# Patient Record
Sex: MALE | Race: BLACK OR AFRICAN AMERICAN | Employment: FULL TIME | URBAN - METROPOLITAN AREA
[De-identification: names, ages, dates, MRNs, and addresses within clinical notes are randomized per-mention and may not be internally consistent; named-entity substitution may affect disease eponyms.]

---

## 2018-09-19 ENCOUNTER — APPOINTMENT (OUTPATIENT)
Dept: CT IMAGING | Age: 58
DRG: 392 | End: 2018-09-19
Attending: EMERGENCY MEDICINE
Payer: COMMERCIAL

## 2018-09-19 ENCOUNTER — APPOINTMENT (OUTPATIENT)
Dept: GENERAL RADIOLOGY | Age: 58
DRG: 392 | End: 2018-09-19
Attending: EMERGENCY MEDICINE
Payer: COMMERCIAL

## 2018-09-19 ENCOUNTER — HOSPITAL ENCOUNTER (OUTPATIENT)
Facility: HOSPITAL | Age: 58
Setting detail: OBSERVATION
Discharge: HOME | DRG: 392 | End: 2018-09-20
Attending: EMERGENCY MEDICINE | Admitting: HOSPITALIST
Payer: COMMERCIAL

## 2018-09-19 DIAGNOSIS — K56.600 PARTIAL SMALL BOWEL OBSTRUCTION (HCC): Primary | ICD-10-CM

## 2018-09-19 PROBLEM — R73.9 HYPERGLYCEMIA: Status: ACTIVE | Noted: 2018-09-19

## 2018-09-19 PROBLEM — R79.89 AZOTEMIA: Status: ACTIVE | Noted: 2018-09-19

## 2018-09-19 LAB
ALBUMIN SERPL-MCNC: 4.3 G/DL (ref 3.5–4.8)
ALBUMIN/GLOB SERPL: 0.9 {RATIO} (ref 1–2)
ALP LIVER SERPL-CCNC: 85 U/L (ref 45–117)
ALT SERPL-CCNC: 38 U/L (ref 17–63)
ANION GAP SERPL CALC-SCNC: 9 MMOL/L (ref 0–18)
AST SERPL-CCNC: 24 U/L (ref 15–41)
ATRIAL RATE: 67 BPM
ATRIAL RATE: 91 BPM
BASOPHILS # BLD AUTO: 0.01 X10(3) UL (ref 0–0.1)
BASOPHILS NFR BLD AUTO: 0.2 %
BILIRUB SERPL-MCNC: 0.7 MG/DL (ref 0.1–2)
BUN BLD-MCNC: 24 MG/DL (ref 8–20)
BUN/CREAT SERPL: 21.8 (ref 10–20)
CALCIUM BLD-MCNC: 9.6 MG/DL (ref 8.3–10.3)
CHLORIDE SERPL-SCNC: 103 MMOL/L (ref 101–111)
CO2 SERPL-SCNC: 26 MMOL/L (ref 22–32)
CREAT BLD-MCNC: 1.1 MG/DL (ref 0.7–1.3)
EOSINOPHIL # BLD AUTO: 0.01 X10(3) UL (ref 0–0.3)
EOSINOPHIL NFR BLD AUTO: 0.2 %
ERYTHROCYTE [DISTWIDTH] IN BLOOD BY AUTOMATED COUNT: 12.1 % (ref 11.5–16)
GLOBULIN PLAS-MCNC: 4.8 G/DL (ref 2.5–4)
GLUCOSE BLD-MCNC: 134 MG/DL (ref 70–99)
HCT VFR BLD AUTO: 47.1 % (ref 37–53)
HGB BLD-MCNC: 15.2 G/DL (ref 13–17)
IMMATURE GRANULOCYTE COUNT: 0.02 X10(3) UL (ref 0–1)
IMMATURE GRANULOCYTE RATIO %: 0.3 %
LIPASE: 120 U/L (ref 73–393)
LYMPHOCYTES # BLD AUTO: 0.74 X10(3) UL (ref 0.9–4)
LYMPHOCYTES NFR BLD AUTO: 12.3 %
M PROTEIN MFR SERPL ELPH: 9.1 G/DL (ref 6.1–8.3)
MCH RBC QN AUTO: 31.2 PG (ref 27–33.2)
MCHC RBC AUTO-ENTMCNC: 32.3 G/DL (ref 31–37)
MCV RBC AUTO: 96.7 FL (ref 80–99)
MONOCYTES # BLD AUTO: 0.23 X10(3) UL (ref 0.1–1)
MONOCYTES NFR BLD AUTO: 3.8 %
NEUTROPHIL ABS PRELIM: 5.03 X10 (3) UL (ref 1.3–6.7)
NEUTROPHILS # BLD AUTO: 5.03 X10(3) UL (ref 1.3–6.7)
NEUTROPHILS NFR BLD AUTO: 83.2 %
OSMOLALITY SERPL CALC.SUM OF ELEC: 292 MOSM/KG (ref 275–295)
P AXIS: 37 DEGREES
P AXIS: 60 DEGREES
P-R INTERVAL: 158 MS
P-R INTERVAL: 184 MS
PLATELET # BLD AUTO: 228 10(3)UL (ref 150–450)
POTASSIUM SERPL-SCNC: 4 MMOL/L (ref 3.6–5.1)
Q-T INTERVAL: 346 MS
Q-T INTERVAL: 390 MS
QRS DURATION: 94 MS
QRS DURATION: 96 MS
QTC CALCULATION (BEZET): 412 MS
QTC CALCULATION (BEZET): 425 MS
R AXIS: 40 DEGREES
R AXIS: 47 DEGREES
RBC # BLD AUTO: 4.87 X10(6)UL (ref 4.3–5.7)
RED CELL DISTRIBUTION WIDTH-SD: 43.4 FL (ref 35.1–46.3)
SODIUM SERPL-SCNC: 138 MMOL/L (ref 136–144)
T AXIS: 18 DEGREES
T AXIS: 39 DEGREES
TROPONIN I SERPL-MCNC: <0.046 NG/ML (ref ?–0.05)
VENTRICULAR RATE: 67 BPM
VENTRICULAR RATE: 91 BPM
WBC # BLD AUTO: 6 X10(3) UL (ref 4–13)

## 2018-09-19 PROCEDURE — 71275 CT ANGIOGRAPHY CHEST: CPT | Performed by: EMERGENCY MEDICINE

## 2018-09-19 PROCEDURE — 99223 1ST HOSP IP/OBS HIGH 75: CPT | Performed by: HOSPITALIST

## 2018-09-19 PROCEDURE — 71045 X-RAY EXAM CHEST 1 VIEW: CPT | Performed by: EMERGENCY MEDICINE

## 2018-09-19 PROCEDURE — 74177 CT ABD & PELVIS W/CONTRAST: CPT | Performed by: EMERGENCY MEDICINE

## 2018-09-19 PROCEDURE — 99223 1ST HOSP IP/OBS HIGH 75: CPT | Performed by: COLON & RECTAL SURGERY

## 2018-09-19 RX ORDER — ONDANSETRON 2 MG/ML
4 INJECTION INTRAMUSCULAR; INTRAVENOUS EVERY 6 HOURS PRN
Status: DISCONTINUED | OUTPATIENT
Start: 2018-09-19 | End: 2018-09-20

## 2018-09-19 RX ORDER — ONDANSETRON 2 MG/ML
4 INJECTION INTRAMUSCULAR; INTRAVENOUS ONCE
Status: COMPLETED | OUTPATIENT
Start: 2018-09-19 | End: 2018-09-19

## 2018-09-19 RX ORDER — HYDROMORPHONE HYDROCHLORIDE 1 MG/ML
0.5 INJECTION, SOLUTION INTRAMUSCULAR; INTRAVENOUS; SUBCUTANEOUS EVERY 30 MIN PRN
Status: ACTIVE | OUTPATIENT
Start: 2018-09-19 | End: 2018-09-19

## 2018-09-19 RX ORDER — SODIUM CHLORIDE 9 MG/ML
INJECTION, SOLUTION INTRAVENOUS CONTINUOUS
Status: DISCONTINUED | OUTPATIENT
Start: 2018-09-19 | End: 2018-09-20

## 2018-09-19 RX ORDER — KETOROLAC TROMETHAMINE 30 MG/ML
15 INJECTION, SOLUTION INTRAMUSCULAR; INTRAVENOUS ONCE
Status: COMPLETED | OUTPATIENT
Start: 2018-09-19 | End: 2018-09-19

## 2018-09-19 RX ORDER — ACETAMINOPHEN 325 MG/1
650 TABLET ORAL EVERY 6 HOURS PRN
Status: DISCONTINUED | OUTPATIENT
Start: 2018-09-19 | End: 2018-09-20

## 2018-09-19 RX ORDER — ONDANSETRON 2 MG/ML
4 INJECTION INTRAMUSCULAR; INTRAVENOUS EVERY 4 HOURS PRN
Status: DISCONTINUED | OUTPATIENT
Start: 2018-09-19 | End: 2018-09-19

## 2018-09-19 RX ORDER — SODIUM CHLORIDE 9 MG/ML
INJECTION, SOLUTION INTRAVENOUS CONTINUOUS
Status: ACTIVE | OUTPATIENT
Start: 2018-09-19 | End: 2018-09-19

## 2018-09-19 RX ORDER — MORPHINE SULFATE 4 MG/ML
4 INJECTION, SOLUTION INTRAMUSCULAR; INTRAVENOUS EVERY 30 MIN PRN
Status: DISCONTINUED | OUTPATIENT
Start: 2018-09-19 | End: 2018-09-20

## 2018-09-19 NOTE — PROGRESS NOTES
Pt seen by dr. Charanjit Meyer with orders, instructed to be NPO & need for stool for cdiff, pt stated last BM/diarrhea was last night, IVF initiated, c/o acid reflux, dr. Charanjit Meyer informed w/ order, protonix given.

## 2018-09-19 NOTE — ED PROVIDER NOTES
Patient Seen in: THE [facility] Emergency Department In [place]    History   Patient presents with:  Abdomen/Flank Pain (GI/)    Stated Complaint: abd pain     HPI    This is a healthy 60-year-old presents for evaluation of abdominal pain and diarrhea.   Sym bilaterally. Abdomen: Soft, moderately tender diffusely, moderately distended. Back: No CVA tenderness. Extremities: No edema. No unilateral calf swelling or calf tenderness to palpation.   Skin: warm and dry, no diaphoresis    ED Course     Labs Revi Rashard repeat EKG without any ST/T-wave changes  MDM   63year-old with diffuse abdominal pain and distention, feeling of acid reflux. DTs shows marketed GERD and possible partial small bowel obstruction.   Given his level of discomfort he will be admitted

## 2018-09-19 NOTE — H&P
[facility]  History and Physical     Nancymarylin Pearson Patient Status:  Emergency    1960 MRN [id number]   Location [facility] IN [place] Attending Susie Patel MD   Hosp Day # 0 PCP PHYSICIAN NONSTAFF     Chief Comp grossly intact. Musculoskeletal: Moves all extremities. Extremities: No edema or cyanosis. Integument: No rashes or lesions. Psychiatric: Appropriate mood and affect.       Diagnostic Data:      Labs:  Recent Labs   Lab  09/19/18   0724   WBC  6.0   HG

## 2018-09-19 NOTE — PLAN OF CARE
Patient received via stretcher from [facility], alert and oriented, denies nausea and SOB, complaints of abdominal pain 5/10, admission navigator completed, nurse updated on patient.

## 2018-09-19 NOTE — ED INITIAL ASSESSMENT (HPI)
Pt states he has r lower abd pain and diarrhea since yesterday and then states he thinks he has food poisoning and severe heartburn.

## 2018-09-20 VITALS
OXYGEN SATURATION: 98 % | SYSTOLIC BLOOD PRESSURE: 132 MMHG | TEMPERATURE: 98 F | HEIGHT: 74 IN | BODY MASS INDEX: 30.8 KG/M2 | HEART RATE: 64 BPM | DIASTOLIC BLOOD PRESSURE: 77 MMHG | WEIGHT: 240 LBS | RESPIRATION RATE: 18 BRPM

## 2018-09-20 LAB
ANION GAP SERPL CALC-SCNC: 3 MMOL/L (ref 0–18)
BUN BLD-MCNC: 14 MG/DL (ref 8–20)
BUN/CREAT SERPL: 12.6 (ref 10–20)
CALCIUM BLD-MCNC: 8.3 MG/DL (ref 8.3–10.3)
CHLORIDE SERPL-SCNC: 109 MMOL/L (ref 101–111)
CO2 SERPL-SCNC: 25 MMOL/L (ref 22–32)
CREAT BLD-MCNC: 1.11 MG/DL (ref 0.7–1.3)
GLUCOSE BLD-MCNC: 94 MG/DL (ref 70–99)
OSMOLALITY SERPL CALC.SUM OF ELEC: 284 MOSM/KG (ref 275–295)
POTASSIUM SERPL-SCNC: 4.1 MMOL/L (ref 3.6–5.1)
SODIUM SERPL-SCNC: 137 MMOL/L (ref 136–144)

## 2018-09-20 PROCEDURE — 99232 SBSQ HOSP IP/OBS MODERATE 35: CPT | Performed by: HOSPITALIST

## 2018-09-20 NOTE — PAYOR COMM NOTE
--------------  ADMISSION REVIEW     Payor: [facility] #:  [id number]  Authorization Number: [id number]    Admit date: 9/19/18  Admit time: 200       Admitting Physician: Michelle Woods MD  Attending Physician:  Shira Dave None (Room air)     Current:/81   Pulse 78   Temp 97.7 °F (36.5 °C) (Oral)   Resp 20   Ht 188 cm (6' 2\")   Wt 108.9 kg   SpO2 97%   BMI 30.81 kg/m²      Physical Exam  General: Patient is awake, alert in mild to moderate painful acute distress.    HE Rashard repeat EKG without any ST/T-wave changes  MDM   63year-old with diffuse abdominal pain and distention, feeling of acid reflux. CTs shows marketed GERD and possible partial small bowel obstruction.   Given his level of discomfort he will be admitted encounter. No current outpatient medications on file prior to encounter. Review of Systems:   A comprehensive 14 point review of systems was completed. Pertinent positives and negatives noted in the HPI.     Physical Exam:    /81   Pulse 78 Active Problem List:     Azotemia     Hyperglycemia     Partial small bowel obstruction (HCC)     Diarrhea of infectious origin  Gastroenteritis vs SBO  Plan:  1. The patient's presentation is consistent with a viral gastroenteritis.  Agree with stool testi #[id number]) on 9/19/18

## 2018-09-20 NOTE — CONSULTS
[facility]  Report of Consultation    Vitaly Solano Patient Status:  Inpatient    1960 MRN [id number]   [facility] 4NW-A Attending Randall Aschoff, MD   Hosp Day # 0 PCP PHYSICIAN NONSTAFF     Reason for Consultation:  Small bowel JEN    History:  History reviewed. No pertinent past medical history. No past surgical history on file. No family history on file. reports that  has never smoked. He does not have any smokeless tobacco history on file.     Allergies:  No Known Allergie Mucous membranes are moist. EOM are intact. Neck: No tenderness to palpitation. Full range of motion to flexion and extension, lateral rotation and lateral flexion of cervical spine. No JVD. Supple. Lungs: No respiratory distress.  No wheezes, no r 350     FINDINGS:       CHEST:    LUNGS:  Bibasilar dependent changes. MEDIASTINUM:  No mass or adenopathy. BREANNA:  No mass or adenopathy. CARDIAC:  Coronary artery calcifications. PLEURA:  No mass or effusion.     CHEST WALL:  No mass or axillary ensure resolution. 2. Findings suggest intrahepatic hemangiomas.            Dictated by: Roger Arana MD on 9/19/2018 at 9:41       Approved by: Roger Arana MD           Impression:  Patient Active Problem List:     Azotemia     Hyperglycemia     Partial smal

## 2018-09-20 NOTE — PROGRESS NOTES
[facility]  Progress Note    [facility] Patient Status:  Inpatient    1960 MRN [id number]   [facility] 4NW-A Attending Kim Perez MD   Hosp Day # 1 PCP PHYSICIAN NONSTAFF     Subjective:  Pt sitting up in bed, reports fee

## 2018-09-20 NOTE — PROGRESS NOTES
[facility]  Progress Note     Vitaly Solano Patient Status:  Inpatient    1960 MRN [id number]   [facility] 4NW-A Attending Randall Aschoff, MD   Hosp Day # 1 PCP PHYSICIAN NONSTAFF     Chief Complaint: Diarrhea    S: Patient to None    Estimated date of discharge: 0-1 days  Discharge is dependent on: Tolerating diet  At this point Mr. Danny Conte is expected to be discharge to: Home    Plan of care discussed with Patient, RN.     Blayne Khan MD

## 2018-09-20 NOTE — PLAN OF CARE
Alert and oriented, no distress, tolerated full liquids for breakfast, advanced to soft for lunch. Abdomen is some with some tenderness to RLQ with palpations. 1300 Tolerated soft diet with no nausea or vomiting, still no stools since admission.  States h

## 2018-09-20 NOTE — PLAN OF CARE
No change overnight. No diarrhea. blanca clear liquids , will advance to full liquids this am. Slept.

## 2018-09-21 NOTE — DISCHARGE SUMMARY
[facility]  DISCHARGE SUMMARY     Eva Lambert Patient Status:  Inpatient    1960 MRN [id number]   [facility] 4NW-A Attending No att. providers found   Hosp Day # 1 PCP PHYSICIAN NONSTAFF     Date of Admission: 2018  Da or gallops. Abdomen: Soft, nontender, nondistended. Positive bowel sounds. No rebound or guarding. Neurologic: No focal neurological deficits. Musculoskeletal: Moves all extremities. Extremities: No edema.   ------------------------------------------

## 2018-09-24 NOTE — PAYOR COMM NOTE
--------------  DISCHARGE REVIEW    Payor: [address] #:  [id number]  Authorization Number: [id number]    Admit date: 9/19/18  Admit time:  0420  Discharge Date: 9/20/2018  3:04 PM     Admitting Physician: Carolann Tellez findings and recommendations (brief descriptions):  • None    Lab/Test results pending at Discharge:   · None    Consultants:  • Dr. China William    Discharge Medication List:     Discharge Medications      You have not been prescribed any medications.          I

## 2022-08-03 ENCOUNTER — OFFICE VISIT (OUTPATIENT)
Dept: INTERNAL MEDICINE CLINIC | Facility: CLINIC | Age: 62
End: 2022-08-03
Payer: COMMERCIAL

## 2022-08-03 VITALS
HEART RATE: 86 BPM | DIASTOLIC BLOOD PRESSURE: 86 MMHG | HEIGHT: 74 IN | TEMPERATURE: 98 F | WEIGHT: 303 LBS | OXYGEN SATURATION: 96 % | BODY MASS INDEX: 38.89 KG/M2 | SYSTOLIC BLOOD PRESSURE: 138 MMHG

## 2022-08-03 DIAGNOSIS — R03.0 ELEVATED BLOOD PRESSURE READING: ICD-10-CM

## 2022-08-03 DIAGNOSIS — L91.8 SKIN TAG: ICD-10-CM

## 2022-08-03 DIAGNOSIS — Z00.00 PHYSICAL EXAM: Primary | ICD-10-CM

## 2022-08-03 DIAGNOSIS — D12.6 COLON ADENOMA: ICD-10-CM

## 2022-08-03 DIAGNOSIS — E66.9 CLASS 2 OBESITY WITHOUT SERIOUS COMORBIDITY WITH BODY MASS INDEX (BMI) OF 38.0 TO 38.9 IN ADULT, UNSPECIFIED OBESITY TYPE: ICD-10-CM

## 2022-08-03 PROCEDURE — 99386 PREV VISIT NEW AGE 40-64: CPT | Performed by: INTERNAL MEDICINE

## 2022-08-03 PROCEDURE — 11200 RMVL SKIN TAGS UP TO&INC 15: CPT | Performed by: INTERNAL MEDICINE

## 2022-08-03 PROCEDURE — 3008F BODY MASS INDEX DOCD: CPT | Performed by: INTERNAL MEDICINE

## 2022-08-03 PROCEDURE — 3079F DIAST BP 80-89 MM HG: CPT | Performed by: INTERNAL MEDICINE

## 2022-08-03 PROCEDURE — 3075F SYST BP GE 130 - 139MM HG: CPT | Performed by: INTERNAL MEDICINE

## 2022-08-04 PROBLEM — D12.6 COLON ADENOMA: Status: ACTIVE | Noted: 2022-08-04

## 2022-08-04 PROBLEM — E66.9 CLASS 2 OBESITY WITHOUT SERIOUS COMORBIDITY WITH BODY MASS INDEX (BMI) OF 38.0 TO 38.9 IN ADULT: Status: ACTIVE | Noted: 2022-08-04

## 2022-08-04 PROBLEM — E66.812 CLASS 2 OBESITY WITHOUT SERIOUS COMORBIDITY WITH BODY MASS INDEX (BMI) OF 38.0 TO 38.9 IN ADULT: Status: ACTIVE | Noted: 2022-08-04

## 2024-12-28 ENCOUNTER — HOSPITAL ENCOUNTER (EMERGENCY)
Age: 64
Discharge: HOME | End: 2024-12-28
Payer: COMMERCIAL

## 2024-12-28 VITALS
OXYGEN SATURATION: 99 % | RESPIRATION RATE: 16 BRPM | HEART RATE: 72 BPM | BODY MASS INDEX: 35.29 KG/M2 | WEIGHT: 275 LBS | TEMPERATURE: 98 F | DIASTOLIC BLOOD PRESSURE: 89 MMHG | HEIGHT: 74 IN | SYSTOLIC BLOOD PRESSURE: 154 MMHG

## 2024-12-28 DIAGNOSIS — R21 RASH: Primary | ICD-10-CM

## 2024-12-28 PROCEDURE — 99283 EMERGENCY DEPT VISIT LOW MDM: CPT

## 2024-12-28 RX ORDER — CIPROFLOXACIN 250 MG/1
250 TABLET, FILM COATED ORAL 2 TIMES DAILY
Qty: 14 TABLET | Refills: 0 | Status: SHIPPED | OUTPATIENT
Start: 2024-12-28 | End: 2025-01-04

## 2024-12-28 RX ORDER — MUPIROCIN 20 MG/G
1 OINTMENT TOPICAL 3 TIMES DAILY
Qty: 1 EACH | Refills: 0 | Status: SHIPPED | OUTPATIENT
Start: 2024-12-28 | End: 2025-01-11

## 2024-12-28 NOTE — ED INITIAL ASSESSMENT (HPI)
C/o scattered rash to arms and legs - recent travel to [facility]  Wife and daughter seen for same yesterday - told rash was from bacteria from the pool

## 2024-12-28 NOTE — DISCHARGE INSTRUCTIONS
Start applying mupirocin ointment every 8 hours while awake for the next 7 to 10 days.  Start taking antihistamine daily such as Zyrtec during the day and Benadryl at night to help with itching.  Avoid scratching as much as possible.  If symptoms persist for longer than 7 to 10 days or if you develop severe itching or pain you should start taking ciprofloxacin.  If you choose to start taking ciprofloxacin you should take to completion.  Return to the ER for any other new or worsening symptoms.

## 2024-12-28 NOTE — ED PROVIDER NOTES
Patient Seen in: [facility] In [place]      History     Chief Complaint   Patient presents with    Rash Skin Problem     Stated Complaint: rash - wife and daughter seen for same yesterday    Subjective:   HPI    64-year-old male with no significant past medical history presents ER for rash that started 2 days ago and worsening today.  Patient reports that his daughter and wife have a similar rash.  They were both seen in the ER yesterday and diagnosed with a bacterial folliculitis thought to be from pool/hot tub.  Patient denies any other new medications, foods.  States that sheets were changed daily and he doubts bedbugs at this time.  No other fever cough or any other complaints.    Objective:     Past Medical History:    Colon adenoma    Colonoscopy done at [facility] in 2020              Past Surgical History:   Procedure Laterality Date    Other      none                Social History     Socioeconomic History    Marital status:    Tobacco Use    Smoking status: Never    Smokeless tobacco: Never   Vaping Use    Vaping status: Never Used   Substance and Sexual Activity    Alcohol use: Yes     Comment: social    Drug use: Never     Social Drivers of Health      Received from [facility]    Social Connections    Received from [facility]    Housing Stability                  Physical Exam     ED Triage Vitals [12/28/24 1117]   /86   Pulse 73   Resp 18   Temp 97.5 °F (36.4 °C)   Temp src Temporal   SpO2 95 %   O2 Device None (Room air)       Current Vitals:   Vital Signs  BP: 154/89  Pulse: 72  Resp: 16  Temp: 97.5 °F (36.4 °C)  Temp src: Temporal    Oxygen Therapy  SpO2: 99 %  O2 Device: None (Room air)        Physical Exam  GENERAL APPEARANCE:  AxOx4, generally well-appearing, no acute distress.  HEENT:  NC, AT.   NECK:  Supple without lymphadenopathy.  No stiffness or restricted ROM.  RESPIRATORY: Normal rate, no respiratory  distress.  EXTREMITIES:  Without cyanosis, clubbing or edema. Normal ROM.  NEUROLOGICAL:  Grossly nonfocal. Observed to ambulate with normal gait.  Skin: Maculopapular slightly raised erythematous rash to extremities, back, slight raised white pustular lesion centrally consistent with a folliculitis.        ED Course   Labs Reviewed - No data to display            MDM      64-year-old male who presents ER for rash.  Vitals within normal limits.  Differential diagnosis includes but does not exclude infestation versus bacterial folliculitis versus viral syndrome versus allergic reaction.  See history and exam above, rash is consistent with a folliculitis.  Discussed treatment at home as well as continued supportive management.  Given prescription for ciprofloxacin, discussed precautions, discussed that he could also wait and only take if itching or pain develops or if rash persists.  Discussed return precautions.  Ready for discharge.        Medical Decision Making      Disposition and Plan     Clinical Impression:  1. Rash         Disposition:  Discharge  12/28/2024  1:12 pm    Follow-up:  Joslyn Mari MD  [address] [place] [id number]  [phone]    Follow up            Medications Prescribed:  Discharge Medication List as of 12/28/2024  1:13 PM        START taking these medications    Details   mupirocin 2 % External Ointment Apply 1 Application topically 3 (three) times daily for 14 days., Normal, Disp-1 each, R-0      ciprofloxacin 250 MG Oral Tab Take 1 tablet (250 mg total) by mouth 2 (two) times daily for 7 days., Normal, Disp-14 tablet, R-0                 Supplementary Documentation:

## (undated) NOTE — LETTER
9/20/2018          To Whom It May Concern:    Christie Guevarag is currently under my medical care and may not return to at this time.     Please excuse him from work, he may return  on Monday      If you require additional information please contact our off

## (undated) NOTE — LETTER
9/20/2018          To Whom It May Concern:    Johanna Mckeon is currently under my medical care and may not return to work at this time. Please excuse Valencia Sheffield for 3 days.  He may return to work on Monday, the 25 th of September    If you require additio